# Patient Record
Sex: FEMALE | Race: ASIAN | NOT HISPANIC OR LATINO | URBAN - METROPOLITAN AREA
[De-identification: names, ages, dates, MRNs, and addresses within clinical notes are randomized per-mention and may not be internally consistent; named-entity substitution may affect disease eponyms.]

---

## 2017-02-23 ENCOUNTER — EMERGENCY (EMERGENCY)
Facility: HOSPITAL | Age: 37
LOS: 1 days | Discharge: HOME | End: 2017-02-23
Attending: EMERGENCY MEDICINE | Admitting: EMERGENCY MEDICINE
Payer: COMMERCIAL

## 2017-02-23 VITALS
SYSTOLIC BLOOD PRESSURE: 120 MMHG | RESPIRATION RATE: 14 BRPM | HEART RATE: 82 BPM | TEMPERATURE: 97 F | DIASTOLIC BLOOD PRESSURE: 77 MMHG | OXYGEN SATURATION: 98 %

## 2017-02-23 VITALS
DIASTOLIC BLOOD PRESSURE: 78 MMHG | SYSTOLIC BLOOD PRESSURE: 119 MMHG | HEART RATE: 88 BPM | TEMPERATURE: 99 F | OXYGEN SATURATION: 98 % | RESPIRATION RATE: 16 BRPM

## 2017-02-23 DIAGNOSIS — O20.9 HEMORRHAGE IN EARLY PREGNANCY, UNSPECIFIED: ICD-10-CM

## 2017-02-23 DIAGNOSIS — O20.8 OTHER HEMORRHAGE IN EARLY PREGNANCY: ICD-10-CM

## 2017-02-23 DIAGNOSIS — Z3A.10 10 WEEKS GESTATION OF PREGNANCY: ICD-10-CM

## 2017-02-23 LAB
APPEARANCE UR: CLEAR — SIGNIFICANT CHANGE UP
BASOPHILS NFR BLD AUTO: 0.5 % — SIGNIFICANT CHANGE UP (ref 0–2)
BILIRUB UR-MCNC: NEGATIVE — SIGNIFICANT CHANGE UP
BLD GP AB SCN SERPL QL: NEGATIVE — SIGNIFICANT CHANGE UP
COLOR SPEC: YELLOW — SIGNIFICANT CHANGE UP
DIFF PNL FLD: (no result)
EOSINOPHIL NFR BLD AUTO: 1.6 % — SIGNIFICANT CHANGE UP (ref 0–6)
GLUCOSE UR QL: NEGATIVE — SIGNIFICANT CHANGE UP
HCG SERPL-ACNC: SIGNIFICANT CHANGE UP MIU/ML
HCT VFR BLD CALC: 40.2 % — SIGNIFICANT CHANGE UP (ref 34.5–45)
HGB BLD-MCNC: 13.3 G/DL — SIGNIFICANT CHANGE UP (ref 11.5–15.5)
IMM GRANULOCYTES NFR BLD AUTO: 1.2 % — SIGNIFICANT CHANGE UP (ref 0–1.5)
KETONES UR-MCNC: NEGATIVE — SIGNIFICANT CHANGE UP
LEUKOCYTE ESTERASE UR-ACNC: NEGATIVE — SIGNIFICANT CHANGE UP
LYMPHOCYTES # BLD AUTO: 13.7 % — SIGNIFICANT CHANGE UP (ref 13–44)
MCHC RBC-ENTMCNC: 30 PG — SIGNIFICANT CHANGE UP (ref 27–34)
MCHC RBC-ENTMCNC: 33.1 G/DL — SIGNIFICANT CHANGE UP (ref 32–36)
MCV RBC AUTO: 90.5 FL — SIGNIFICANT CHANGE UP (ref 80–100)
MONOCYTES NFR BLD AUTO: 3.5 % — SIGNIFICANT CHANGE UP (ref 2–14)
NEUTROPHILS NFR BLD AUTO: 79.5 % — HIGH (ref 43–77)
NITRITE UR-MCNC: NEGATIVE — SIGNIFICANT CHANGE UP
PH UR: 6 — SIGNIFICANT CHANGE UP (ref 4–8.1)
PLATELET # BLD AUTO: 327 K/UL — SIGNIFICANT CHANGE UP (ref 150–400)
PROT UR-MCNC: NEGATIVE MG/DL — SIGNIFICANT CHANGE UP
RBC # BLD: 4.44 M/UL — SIGNIFICANT CHANGE UP (ref 3.8–5.2)
RBC # FLD: 13.1 % — SIGNIFICANT CHANGE UP (ref 10.3–16.9)
RH IG SCN BLD-IMP: POSITIVE — SIGNIFICANT CHANGE UP
RH IG SCN BLD-IMP: POSITIVE — SIGNIFICANT CHANGE UP
SP GR SPEC: 1.01 — SIGNIFICANT CHANGE UP (ref 1–1.03)
UROBILINOGEN FLD QL: 0.2 E.U./DL — SIGNIFICANT CHANGE UP
WBC # BLD: 17.4 K/UL — HIGH (ref 3.8–10.5)
WBC # FLD AUTO: 17.4 K/UL — HIGH (ref 3.8–10.5)

## 2017-02-23 PROCEDURE — 76830 TRANSVAGINAL US NON-OB: CPT | Mod: 26

## 2017-02-23 PROCEDURE — 99285 EMERGENCY DEPT VISIT HI MDM: CPT

## 2017-02-23 NOTE — ED PROVIDER NOTE - PROGRESS NOTE DETAILS
Pt informed of results, verbalizes understanding. OB Dr. Whitney Nichole called to go over results and have them faxed. Kumar Allen, Resident.

## 2017-02-23 NOTE — ED PROVIDER NOTE - CARE PLAN
Principal Discharge DX:	Vaginal bleeding Principal Discharge DX:	Vaginal bleeding  Secondary Diagnosis:	Subchorionic hematoma in first trimester

## 2017-02-23 NOTE — ED PROVIDER NOTE - MEDICAL DECISION MAKING DETAILS
37F  10wk presents with painless vaginal bleeding episode that has since resolved. Denies symptoms. Had prior US showing IUP. Will obtain blood, urine, and US. Kumar Allen, Resident.

## 2017-02-23 NOTE — ED PROVIDER NOTE - OBJECTIVE STATEMENT
37F  history of hypothyroidism presents with 1 episode of painless vaginal bleeding that started 1 hr prior to arrival while brushing her teeth. Small amount, no clots, and thinks it has ceased. Has history of miscarriage at 10 wk 5 yr ago. Otherwise, has been well and denies lightheadedness, headache, chest pain, shortness of breath, palpitations, abdominal pain, bowel/bladder changes.

## 2017-02-23 NOTE — ED PROVIDER NOTE - ATTENDING CONTRIBUTION TO CARE
37 y.o. female with vaginal bleeding, previously heavy but now just spotting, no pelvic pain, no abd pain, approx 10 weeks pregnant, US (+) IUP with , UA negative, given outpatient OB followup, US findings of subchorionic hemorrhage discussed, encouraged to get OB /fu ASAP, stable for dc home

## 2017-06-24 NOTE — ED ADULT TRIAGE NOTE - BP NONINVASIVE DIASTOLIC (MM HG)
ED Visit Note


First contact with patient:  13:36


CHIEF COMPLAINT: Left-sided forehead laceration








HISTORY OF PRESENT ILLNESS: This 79-year-old female patient presents to the 

emergency department approximately 1 hour after cutting the left forehead, 

above the eyebrow.  Patient states she was walking at a community garage sale 

in [place], when she fell over and object.  She states she fell from 

standing, and reports initially landing on her knees.  She states she believes 

she hit her head off of another object, however is uncertain.  Patient denies 

dizziness, loss of consciousness, headache, visual disturbances, nausea, 

vomiting, drowsiness.  The bleeding stopped shortly after the injury and there 

is no weakness or numbness of the area.  The patient did scrape both knees, 

however states she refuses evaluation of her knees.  Tetanus shot is not up-to-

date, and patient states she will not take a tetanus shot.  The patient denies 

any pain.


  





REVIEW OF SYSTEMS:   A 6 system review of systems was completed with positives 

and pertinent negatives listed in the HPI. 








ALLERGIES: None








MEDICATIONS: None





  


PMH: None








SOCIAL HISTORY: Patient lives locally.  She denies alcohol, drug, tobacco use.








PHYSICAL EXAM: Vital Signs: Reviewed Nurse's notes, vital signs stable.  GENERAL

: 79-year-old female, appears significantly younger than her age, in no acute 

distress, well-developed, well-nourished.  SKIN:  There is a 1.5 cm long 

laceration just above the left eyebrow.  It is superficial and the edges only 

mildly gape apart with traction, but lay well without traction. There is no 

foreign material in the wound and it looks clean. There is no active bleeding.  

No deep structures such as tendons or nerves are seen in the base of the wound.

  Sensation to pain and light touch is intact.








EMERGENCY DEPARTMENT COURSE:  I examined the patient.  Verbal consent was 

obtained to perform the procedure.  The laceration was cleaned with betadine 

and sterile saline and there was no bleeding.  The edges of the laceration were 

approximated and secured with 3 layers of Dermabond glue with good wound 

approximation.  The patient tolerated the procedure well.  The patient was 

discharged home in stable condition.    


 





DIAGNOSIS:  forehead laceration





DIFFERENTIAL DIAGNOSIS: contusion, concussion, subdural hematoma, epidural 

hematoma, infection, and others.








DISCHARGE INSTRUCTIONS & TREATMENT: 


Read DermaBond handout. 





Ice and elevate for swelling and pain. 





Ibuprofen 600 mg and Tylenol 1000 mg every 6 hrs for pain if needed. 





Return to the ED or seek care from your PCP for any signs of infection (

increasing redness, swelling, drainage, fever). Return to the ED if you 

experience headache, dizziness, visual disturbances, loss of consciousness, 

altered mental status, or confusion.





Keep covered when in sun until fully healed then SPF 50 or higher for one year. 





Vitamin E oil if desired two weeks after fully healed for reduction of scar. 





Follow up with your PCP in 2-3 days for recheck of wound.


Problem List


Medical Problems:


(1) Arthritis


Status: Chronic  











Current/Historical Medications


No Active Prescriptions or Reported Meds





Allergies


Coded Allergies:  


     No Known Allergies (Unverified , 6/24/17)





Vital Signs











  Date Time  Temp Pulse Resp B/P (MAP) Pulse Ox O2 Delivery O2 Flow Rate FiO2


 


6/24/17 13:31 36.8 92 18 204/114 95 Room Air  











Departure Information


Impression





 Primary Impression:  


 Facial laceration


 Additional Impression:  


 Fall





Dispostion


Home / Self-Care





Condition


GOOD





Prescriptions





No Active Prescriptions or Reported Meds





Referrals


Hong Lee M.D. (PCP)





Patient Instructions


ED Laceration Facial Skin Glue, My [facility]





Additional Instructions





Read DermaBond handout. 





Ice and elevate for swelling and pain. 





Ibuprofen 600 mg and Tylenol 1000 mg every 6 hrs for pain if needed. 





Return to the ED or seek care from your PCP for any signs of infection (

increasing redness, swelling, drainage, fever). 





Keep covered when in sun until fully healed then SPF 50 or higher for one year. 





Vitamin E oil if desired two weeks after fully healed for reduction of scar. 





Follow up with your PCP in 2-3 days for recheck of wound.





Problem Qualifiers








 Primary Impression:  


 Facial laceration


 Encounter type:  initial encounter  Qualified Codes:  S01.81XA - Laceration 

without foreign body of other part of head, initial encounter


 Additional Impression:  


 Fall


 Encounter type:  initial encounter  Qualified Codes:  W19.XXXA - Unspecified 

fall, initial encounter 77

## 2019-11-13 PROBLEM — Z00.00 ENCOUNTER FOR PREVENTIVE HEALTH EXAMINATION: Status: ACTIVE | Noted: 2019-11-13

## 2019-11-19 ENCOUNTER — APPOINTMENT (OUTPATIENT)
Dept: ANTEPARTUM | Facility: CLINIC | Age: 39
End: 2019-11-19
Payer: COMMERCIAL

## 2019-11-19 PROCEDURE — 76813 OB US NUCHAL MEAS 1 GEST: CPT

## 2019-11-19 PROCEDURE — 76801 OB US < 14 WKS SINGLE FETUS: CPT

## 2019-12-19 ENCOUNTER — APPOINTMENT (OUTPATIENT)
Dept: ANTEPARTUM | Facility: CLINIC | Age: 39
End: 2019-12-19
Payer: COMMERCIAL

## 2019-12-19 PROCEDURE — 76811 OB US DETAILED SNGL FETUS: CPT

## 2019-12-19 PROCEDURE — 76817 TRANSVAGINAL US OBSTETRIC: CPT

## 2020-01-22 ENCOUNTER — APPOINTMENT (OUTPATIENT)
Dept: ANTEPARTUM | Facility: CLINIC | Age: 40
End: 2020-01-22
Payer: COMMERCIAL

## 2020-01-22 PROCEDURE — 76811 OB US DETAILED SNGL FETUS: CPT

## 2020-01-22 PROCEDURE — 76817 TRANSVAGINAL US OBSTETRIC: CPT

## 2020-02-29 ENCOUNTER — OUTPATIENT (OUTPATIENT)
Dept: INPATIENT UNIT | Facility: HOSPITAL | Age: 40
LOS: 1 days | Discharge: HOME | End: 2020-02-29
Payer: COMMERCIAL

## 2020-02-29 VITALS
TEMPERATURE: 98 F | RESPIRATION RATE: 18 BRPM | SYSTOLIC BLOOD PRESSURE: 105 MMHG | HEART RATE: 100 BPM | OXYGEN SATURATION: 100 % | DIASTOLIC BLOOD PRESSURE: 70 MMHG

## 2020-02-29 VITALS
RESPIRATION RATE: 16 BRPM | SYSTOLIC BLOOD PRESSURE: 104 MMHG | DIASTOLIC BLOOD PRESSURE: 66 MMHG | HEART RATE: 106 BPM | TEMPERATURE: 98 F | OXYGEN SATURATION: 98 % | WEIGHT: 125.66 LBS | HEIGHT: 62 IN

## 2020-02-29 LAB
ALBUMIN SERPL ELPH-MCNC: 2.7 G/DL — LOW (ref 3.4–5)
ALP SERPL-CCNC: 60 U/L — SIGNIFICANT CHANGE UP (ref 40–120)
ALT FLD-CCNC: 16 U/L — SIGNIFICANT CHANGE UP (ref 12–42)
ANION GAP SERPL CALC-SCNC: 9 MMOL/L — SIGNIFICANT CHANGE UP (ref 9–16)
APTT BLD: 28.4 SEC — SIGNIFICANT CHANGE UP (ref 27.5–36.3)
AST SERPL-CCNC: 10 U/L — LOW (ref 15–37)
BASOPHILS # BLD AUTO: 0.04 K/UL — SIGNIFICANT CHANGE UP (ref 0–0.2)
BASOPHILS NFR BLD AUTO: 0.4 % — SIGNIFICANT CHANGE UP (ref 0–2)
BILIRUB SERPL-MCNC: 0.3 MG/DL — SIGNIFICANT CHANGE UP (ref 0.2–1.2)
BLD GP AB SCN SERPL QL: NEGATIVE — SIGNIFICANT CHANGE UP
BLD GP AB SCN SERPL QL: NEGATIVE — SIGNIFICANT CHANGE UP
BUN SERPL-MCNC: 15 MG/DL — SIGNIFICANT CHANGE UP (ref 7–23)
CALCIUM SERPL-MCNC: 9.2 MG/DL — SIGNIFICANT CHANGE UP (ref 8.5–10.5)
CHLORIDE SERPL-SCNC: 106 MMOL/L — SIGNIFICANT CHANGE UP (ref 96–108)
CO2 SERPL-SCNC: 26 MMOL/L — SIGNIFICANT CHANGE UP (ref 22–31)
CREAT SERPL-MCNC: 0.68 MG/DL — SIGNIFICANT CHANGE UP (ref 0.5–1.3)
EOSINOPHIL # BLD AUTO: 0.23 K/UL — SIGNIFICANT CHANGE UP (ref 0–0.5)
EOSINOPHIL NFR BLD AUTO: 2.5 % — SIGNIFICANT CHANGE UP (ref 0–6)
GLUCOSE SERPL-MCNC: 127 MG/DL — HIGH (ref 70–99)
HCT VFR BLD CALC: 38.1 % — SIGNIFICANT CHANGE UP (ref 34.5–45)
HGB BLD-MCNC: 12.5 G/DL — SIGNIFICANT CHANGE UP (ref 11.5–15.5)
IMM GRANULOCYTES NFR BLD AUTO: 0.3 % — SIGNIFICANT CHANGE UP (ref 0–1.5)
INR BLD: 0.98 — SIGNIFICANT CHANGE UP (ref 0.88–1.16)
LYMPHOCYTES # BLD AUTO: 1.87 K/UL — SIGNIFICANT CHANGE UP (ref 1–3.3)
LYMPHOCYTES # BLD AUTO: 20.4 % — SIGNIFICANT CHANGE UP (ref 13–44)
MCHC RBC-ENTMCNC: 27.8 PG — SIGNIFICANT CHANGE UP (ref 27–34)
MCHC RBC-ENTMCNC: 32.8 GM/DL — SIGNIFICANT CHANGE UP (ref 32–36)
MCV RBC AUTO: 84.9 FL — SIGNIFICANT CHANGE UP (ref 80–100)
MONOCYTES # BLD AUTO: 0.59 K/UL — SIGNIFICANT CHANGE UP (ref 0–0.9)
MONOCYTES NFR BLD AUTO: 6.4 % — SIGNIFICANT CHANGE UP (ref 2–14)
NEUTROPHILS # BLD AUTO: 6.42 K/UL — SIGNIFICANT CHANGE UP (ref 1.8–7.4)
NEUTROPHILS NFR BLD AUTO: 70 % — SIGNIFICANT CHANGE UP (ref 43–77)
NRBC # BLD: 0 /100 WBCS — SIGNIFICANT CHANGE UP (ref 0–0)
PLATELET # BLD AUTO: 302 K/UL — SIGNIFICANT CHANGE UP (ref 150–400)
POTASSIUM SERPL-MCNC: 4.8 MMOL/L — SIGNIFICANT CHANGE UP (ref 3.5–5.3)
POTASSIUM SERPL-SCNC: 4.8 MMOL/L — SIGNIFICANT CHANGE UP (ref 3.5–5.3)
PROT SERPL-MCNC: 6.7 G/DL — SIGNIFICANT CHANGE UP (ref 6.4–8.2)
PROTHROM AB SERPL-ACNC: 10.8 SEC — SIGNIFICANT CHANGE UP (ref 10–12.9)
RBC # BLD: 4.49 M/UL — SIGNIFICANT CHANGE UP (ref 3.8–5.2)
RBC # FLD: 13.5 % — SIGNIFICANT CHANGE UP (ref 10.3–14.5)
RH IG SCN BLD-IMP: POSITIVE — SIGNIFICANT CHANGE UP
RH IG SCN BLD-IMP: POSITIVE — SIGNIFICANT CHANGE UP
SODIUM SERPL-SCNC: 141 MMOL/L — SIGNIFICANT CHANGE UP (ref 132–145)
WBC # BLD: 9.18 K/UL — SIGNIFICANT CHANGE UP (ref 3.8–10.5)
WBC # FLD AUTO: 9.18 K/UL — SIGNIFICANT CHANGE UP (ref 3.8–10.5)

## 2020-02-29 PROCEDURE — 76805 OB US >/= 14 WKS SNGL FETUS: CPT

## 2020-02-29 PROCEDURE — 86901 BLOOD TYPING SEROLOGIC RH(D): CPT

## 2020-02-29 PROCEDURE — 99285 EMERGENCY DEPT VISIT HI MDM: CPT

## 2020-02-29 PROCEDURE — 85730 THROMBOPLASTIN TIME PARTIAL: CPT

## 2020-02-29 PROCEDURE — 76817 TRANSVAGINAL US OBSTETRIC: CPT | Mod: 26

## 2020-02-29 PROCEDURE — 76818 FETAL BIOPHYS PROFILE W/NST: CPT

## 2020-02-29 PROCEDURE — 36415 COLL VENOUS BLD VENIPUNCTURE: CPT

## 2020-02-29 PROCEDURE — 85025 COMPLETE CBC W/AUTO DIFF WBC: CPT

## 2020-02-29 PROCEDURE — 76805 OB US >/= 14 WKS SNGL FETUS: CPT | Mod: 26

## 2020-02-29 PROCEDURE — 85610 PROTHROMBIN TIME: CPT

## 2020-02-29 PROCEDURE — 80053 COMPREHEN METABOLIC PANEL: CPT

## 2020-02-29 PROCEDURE — 86850 RBC ANTIBODY SCREEN: CPT

## 2020-02-29 PROCEDURE — 76817 TRANSVAGINAL US OBSTETRIC: CPT

## 2020-02-29 NOTE — ED PROVIDER NOTE - ATTENDING CONTRIBUTION TO CARE
Nontoxic well appearing patient A3 here for evaluation of vaginal spotting after minor abdominal trauma at 28 weeks of pregnancy.  No abdominal pain/tenderness.  Sono shows +IUP 26wks, with good FHR. Will transfer to [facility] L&D for further monitoring.

## 2020-02-29 NOTE — ED PROVIDER NOTE - CLINICAL SUMMARY MEDICAL DECISION MAKING FREE TEXT BOX
41 y/o F presents to ED c/o vaginal bleeding s/p fall yesterday.  The fall has since resolved.  She denies back pain, abd pain, cramping.  OB u/s shows , no blood.  Pt discussed with Dr. Tang, OB at [facility].  Will transfer to L&D.  Pt also discussed with Dr. Long at 1060 OB who agrees with plan with transfer.

## 2020-02-29 NOTE — ED ADULT NURSE NOTE - OBJECTIVE STATEMENT
Pt presents to ED c/o vaginal spotting last night s/p mechanical fall landing on abdomen. Pt is currently x28 weeks pregnant,  Pt presents to ED c/o vaginal spotting last night s/p mechanical fall landing on abdomen. Pt is currently x28 weeks pregnant, , x2 abortions and x1 miscarriage in the past, 1 full term. Pt denies any pain or bleeding today, reports baby has been moving normally. Was directed to ED for ultrasound.

## 2020-02-29 NOTE — ED ADULT NURSE NOTE - NSIMPLEMENTINTERV_GEN_ALL_ED
Implemented All Universal Safety Interventions:  [place] to call system. Call bell, personal items and telephone within reach. Instruct patient to call for assistance. Room bathroom lighting operational. Non-slip footwear when patient is off stretcher. Physically safe environment: no spills, clutter or unnecessary equipment. Stretcher in lowest position, wheels locked, appropriate side rails in place.

## 2020-02-29 NOTE — ED PROVIDER NOTE - OBJECTIVE STATEMENT
41 y/o F with PMH of sciatica, thyroid disease, A3, approx 28 weeks gestation, CHRYSTAL May 24, presents to ED c/o scant, brown vaginal bleeding noted after a fall yesterday.  She states she got a sudden spasm while standing at home causing her to fall forward.  She scraped her knees and struck her abd on the coffee table.  She then noted scant vaginal bleeding approx 2 hours after the fall.  She denies abd pain, fevers/chills, weakness, numbness.    reported blood type A+

## 2020-02-29 NOTE — ED ADULT NURSE NOTE - CHPI ED NUR SYMPTOMS NEG
no back pain/no coffee grounds emesis/no vaginal discharge/no discharge/no fever/no pain/no abdominal pain/no nausea/no vomiting

## 2020-02-29 NOTE — ED ADULT TRIAGE NOTE - CHIEF COMPLAINT QUOTE
patient walk in c/o right leg pain; "sciatica "; also wants eval for vaginal bleeding last night; brown discharge; 28 weeks pregnant; denies any abd pain or vaginal bleeding at this moment

## 2020-03-01 RX ORDER — SODIUM CHLORIDE 9 MG/ML
1000 INJECTION, SOLUTION INTRAVENOUS
Refills: 0 | Status: DISCONTINUED | OUTPATIENT
Start: 2020-03-01 | End: 2020-03-01

## 2020-03-01 RX ORDER — AMPICILLIN TRIHYDRATE 250 MG
2 CAPSULE ORAL ONCE
Refills: 0 | Status: DISCONTINUED | OUTPATIENT
Start: 2020-03-01 | End: 2020-03-01

## 2020-03-01 RX ORDER — CITRIC ACID/SODIUM CITRATE 300-500 MG
15 SOLUTION, ORAL ORAL EVERY 6 HOURS
Refills: 0 | Status: DISCONTINUED | OUTPATIENT
Start: 2020-03-01 | End: 2020-03-01

## 2020-03-01 RX ORDER — OXYTOCIN 10 UNIT/ML
333.33 VIAL (ML) INJECTION
Qty: 20 | Refills: 0 | Status: DISCONTINUED | OUTPATIENT
Start: 2020-03-01 | End: 2020-03-01

## 2020-03-01 RX ORDER — AMPICILLIN TRIHYDRATE 250 MG
1 CAPSULE ORAL EVERY 4 HOURS
Refills: 0 | Status: DISCONTINUED | OUTPATIENT
Start: 2020-03-01 | End: 2020-03-01

## 2020-03-03 DIAGNOSIS — O26.899 OTHER SPECIFIED PREGNANCY RELATED CONDITIONS, UNSPECIFIED TRIMESTER: ICD-10-CM

## 2020-03-03 DIAGNOSIS — Z3A.00 WEEKS OF GESTATION OF PREGNANCY NOT SPECIFIED: ICD-10-CM

## 2020-03-05 ENCOUNTER — APPOINTMENT (OUTPATIENT)
Dept: ANTEPARTUM | Facility: CLINIC | Age: 40
End: 2020-03-05
Payer: COMMERCIAL

## 2020-03-05 DIAGNOSIS — O26.853 SPOTTING COMPLICATING PREGNANCY, THIRD TRIMESTER: ICD-10-CM

## 2020-03-05 DIAGNOSIS — Z3A.28 28 WEEKS GESTATION OF PREGNANCY: ICD-10-CM

## 2020-03-05 PROBLEM — E07.9 DISORDER OF THYROID, UNSPECIFIED: Chronic | Status: ACTIVE | Noted: 2020-02-29

## 2020-03-05 PROBLEM — M54.30 SCIATICA, UNSPECIFIED SIDE: Chronic | Status: ACTIVE | Noted: 2020-02-29

## 2020-03-05 PROCEDURE — 76816 OB US FOLLOW-UP PER FETUS: CPT

## 2020-03-05 PROCEDURE — 76819 FETAL BIOPHYS PROFIL W/O NST: CPT

## 2020-04-16 ENCOUNTER — APPOINTMENT (OUTPATIENT)
Dept: ANTEPARTUM | Facility: CLINIC | Age: 40
End: 2020-04-16
Payer: COMMERCIAL

## 2020-04-16 PROCEDURE — 76816 OB US FOLLOW-UP PER FETUS: CPT

## 2020-04-16 PROCEDURE — 76819 FETAL BIOPHYS PROFIL W/O NST: CPT

## 2020-05-08 ENCOUNTER — APPOINTMENT (OUTPATIENT)
Dept: ANTEPARTUM | Facility: CLINIC | Age: 40
End: 2020-05-08
Payer: COMMERCIAL

## 2020-05-08 PROCEDURE — 76819 FETAL BIOPHYS PROFIL W/O NST: CPT

## 2020-05-08 PROCEDURE — 76815 OB US LIMITED FETUS(S): CPT

## 2020-05-14 ENCOUNTER — APPOINTMENT (OUTPATIENT)
Dept: ANTEPARTUM | Facility: CLINIC | Age: 40
End: 2020-05-14
Payer: COMMERCIAL

## 2020-05-14 PROCEDURE — 76819 FETAL BIOPHYS PROFIL W/O NST: CPT

## 2020-05-21 ENCOUNTER — TRANSCRIPTION ENCOUNTER (OUTPATIENT)
Age: 40
End: 2020-05-21

## 2020-05-21 ENCOUNTER — OUTPATIENT (OUTPATIENT)
Dept: OUTPATIENT SERVICES | Facility: HOSPITAL | Age: 40
LOS: 1 days | End: 2020-05-21
Payer: COMMERCIAL

## 2020-05-21 DIAGNOSIS — Z01.818 ENCOUNTER FOR OTHER PREPROCEDURAL EXAMINATION: ICD-10-CM

## 2020-05-21 LAB
BLD GP AB SCN SERPL QL: NEGATIVE — SIGNIFICANT CHANGE UP
HCT VFR BLD CALC: 37.4 % — SIGNIFICANT CHANGE UP (ref 34.5–45)
HGB BLD-MCNC: 11.4 G/DL — LOW (ref 11.5–15.5)
MCHC RBC-ENTMCNC: 25.1 PG — LOW (ref 27–34)
MCHC RBC-ENTMCNC: 30.5 GM/DL — LOW (ref 32–36)
MCV RBC AUTO: 82.2 FL — SIGNIFICANT CHANGE UP (ref 80–100)
NRBC # BLD: 0 /100 WBCS — SIGNIFICANT CHANGE UP (ref 0–0)
PLATELET # BLD AUTO: 285 K/UL — SIGNIFICANT CHANGE UP (ref 150–400)
RBC # BLD: 4.55 M/UL — SIGNIFICANT CHANGE UP (ref 3.8–5.2)
RBC # FLD: 14.5 % — SIGNIFICANT CHANGE UP (ref 10.3–14.5)
RH IG SCN BLD-IMP: POSITIVE — SIGNIFICANT CHANGE UP
WBC # BLD: 8.07 K/UL — SIGNIFICANT CHANGE UP (ref 3.8–10.5)
WBC # FLD AUTO: 8.07 K/UL — SIGNIFICANT CHANGE UP (ref 3.8–10.5)

## 2020-05-21 PROCEDURE — 86850 RBC ANTIBODY SCREEN: CPT

## 2020-05-21 PROCEDURE — 85027 COMPLETE CBC AUTOMATED: CPT

## 2020-05-21 PROCEDURE — 86780 TREPONEMA PALLIDUM: CPT

## 2020-05-21 PROCEDURE — 86901 BLOOD TYPING SEROLOGIC RH(D): CPT

## 2020-05-22 ENCOUNTER — INPATIENT (INPATIENT)
Facility: HOSPITAL | Age: 40
LOS: 1 days | Discharge: HOME | End: 2020-05-24
Attending: OBSTETRICS & GYNECOLOGY | Admitting: OBSTETRICS & GYNECOLOGY
Payer: COMMERCIAL

## 2020-05-22 ENCOUNTER — RESULT REVIEW (OUTPATIENT)
Age: 40
End: 2020-05-22

## 2020-05-22 VITALS — HEIGHT: 65 IN | WEIGHT: 138.89 LBS

## 2020-05-22 LAB — T PALLIDUM AB TITR SER: NEGATIVE — SIGNIFICANT CHANGE UP

## 2020-05-22 PROCEDURE — 59514 CESAREAN DELIVERY ONLY: CPT | Mod: AS

## 2020-05-22 PROCEDURE — 88307 TISSUE EXAM BY PATHOLOGIST: CPT | Mod: 26

## 2020-05-22 RX ORDER — CITRIC ACID/SODIUM CITRATE 300-500 MG
30 SOLUTION, ORAL ORAL ONCE
Refills: 0 | Status: DISCONTINUED | OUTPATIENT
Start: 2020-05-22 | End: 2020-05-22

## 2020-05-22 RX ORDER — LANOLIN
1 OINTMENT (GRAM) TOPICAL EVERY 6 HOURS
Refills: 0 | Status: DISCONTINUED | OUTPATIENT
Start: 2020-05-22 | End: 2020-05-24

## 2020-05-22 RX ORDER — OXYTOCIN 10 UNIT/ML
333.33 VIAL (ML) INJECTION
Qty: 20 | Refills: 0 | Status: DISCONTINUED | OUTPATIENT
Start: 2020-05-22 | End: 2020-05-22

## 2020-05-22 RX ORDER — IBUPROFEN 200 MG
600 TABLET ORAL EVERY 6 HOURS
Refills: 0 | Status: COMPLETED | OUTPATIENT
Start: 2020-05-22 | End: 2021-04-20

## 2020-05-22 RX ORDER — KETOROLAC TROMETHAMINE 30 MG/ML
30 SYRINGE (ML) INJECTION EVERY 6 HOURS
Refills: 0 | Status: DISCONTINUED | OUTPATIENT
Start: 2020-05-22 | End: 2020-05-23

## 2020-05-22 RX ORDER — SODIUM CHLORIDE 9 MG/ML
1000 INJECTION, SOLUTION INTRAVENOUS
Refills: 0 | Status: DISCONTINUED | OUTPATIENT
Start: 2020-05-22 | End: 2020-05-22

## 2020-05-22 RX ORDER — SODIUM CHLORIDE 9 MG/ML
1000 INJECTION, SOLUTION INTRAVENOUS
Refills: 0 | Status: DISCONTINUED | OUTPATIENT
Start: 2020-05-22 | End: 2020-05-24

## 2020-05-22 RX ORDER — MAGNESIUM HYDROXIDE 400 MG/1
30 TABLET, CHEWABLE ORAL
Refills: 0 | Status: DISCONTINUED | OUTPATIENT
Start: 2020-05-22 | End: 2020-05-24

## 2020-05-22 RX ORDER — SODIUM CHLORIDE 9 MG/ML
1000 INJECTION, SOLUTION INTRAVENOUS ONCE
Refills: 0 | Status: DISCONTINUED | OUTPATIENT
Start: 2020-05-22 | End: 2020-05-22

## 2020-05-22 RX ORDER — OXYTOCIN 10 UNIT/ML
333.33 VIAL (ML) INJECTION
Qty: 20 | Refills: 0 | Status: DISCONTINUED | OUTPATIENT
Start: 2020-05-22 | End: 2020-05-24

## 2020-05-22 RX ORDER — METOCLOPRAMIDE HCL 10 MG
10 TABLET ORAL ONCE
Refills: 0 | Status: DISCONTINUED | OUTPATIENT
Start: 2020-05-22 | End: 2020-05-22

## 2020-05-22 RX ORDER — OXYCODONE HYDROCHLORIDE 5 MG/1
5 TABLET ORAL ONCE
Refills: 0 | Status: DISCONTINUED | OUTPATIENT
Start: 2020-05-22 | End: 2020-05-24

## 2020-05-22 RX ORDER — TETANUS TOXOID, REDUCED DIPHTHERIA TOXOID AND ACELLULAR PERTUSSIS VACCINE, ADSORBED 5; 2.5; 8; 8; 2.5 [IU]/.5ML; [IU]/.5ML; UG/.5ML; UG/.5ML; UG/.5ML
0.5 SUSPENSION INTRAMUSCULAR ONCE
Refills: 0 | Status: COMPLETED | OUTPATIENT
Start: 2020-05-22

## 2020-05-22 RX ORDER — FAMOTIDINE 10 MG/ML
20 INJECTION INTRAVENOUS ONCE
Refills: 0 | Status: DISCONTINUED | OUTPATIENT
Start: 2020-05-22 | End: 2020-05-22

## 2020-05-22 RX ORDER — ENOXAPARIN SODIUM 100 MG/ML
40 INJECTION SUBCUTANEOUS EVERY 24 HOURS
Refills: 0 | Status: DISCONTINUED | OUTPATIENT
Start: 2020-05-23 | End: 2020-05-24

## 2020-05-22 RX ORDER — DIPHENHYDRAMINE HCL 50 MG
25 CAPSULE ORAL EVERY 6 HOURS
Refills: 0 | Status: DISCONTINUED | OUTPATIENT
Start: 2020-05-22 | End: 2020-05-23

## 2020-05-22 RX ORDER — OXYCODONE HYDROCHLORIDE 5 MG/1
5 TABLET ORAL
Refills: 0 | Status: DISCONTINUED | OUTPATIENT
Start: 2020-05-22 | End: 2020-05-24

## 2020-05-22 RX ORDER — ONDANSETRON 8 MG/1
4 TABLET, FILM COATED ORAL EVERY 6 HOURS
Refills: 0 | Status: DISCONTINUED | OUTPATIENT
Start: 2020-05-22 | End: 2020-05-24

## 2020-05-22 RX ORDER — SIMETHICONE 80 MG/1
80 TABLET, CHEWABLE ORAL EVERY 4 HOURS
Refills: 0 | Status: DISCONTINUED | OUTPATIENT
Start: 2020-05-22 | End: 2020-05-24

## 2020-05-22 RX ORDER — ACETAMINOPHEN 500 MG
975 TABLET ORAL
Refills: 0 | Status: DISCONTINUED | OUTPATIENT
Start: 2020-05-22 | End: 2020-05-24

## 2020-05-22 RX ORDER — CEFAZOLIN SODIUM 1 G
2000 VIAL (EA) INJECTION ONCE
Refills: 0 | Status: DISCONTINUED | OUTPATIENT
Start: 2020-05-22 | End: 2020-05-22

## 2020-05-22 RX ORDER — NALOXONE HYDROCHLORIDE 4 MG/.1ML
0.1 SPRAY NASAL
Refills: 0 | Status: DISCONTINUED | OUTPATIENT
Start: 2020-05-22 | End: 2020-05-24

## 2020-05-22 RX ADMIN — Medication 975 MILLIGRAM(S): at 16:46

## 2020-05-22 RX ADMIN — Medication 30 MILLIGRAM(S): at 15:45

## 2020-05-22 RX ADMIN — Medication 25 MILLIGRAM(S): at 22:41

## 2020-05-22 RX ADMIN — Medication 25 MILLIGRAM(S): at 16:46

## 2020-05-22 RX ADMIN — Medication 30 MILLILITER(S): at 12:50

## 2020-05-22 RX ADMIN — FAMOTIDINE 20 MILLIGRAM(S): 10 INJECTION INTRAVENOUS at 12:50

## 2020-05-22 RX ADMIN — Medication 975 MILLIGRAM(S): at 21:13

## 2020-05-22 RX ADMIN — Medication 100 MILLIGRAM(S): at 12:50

## 2020-05-22 NOTE — LACTATION INITIAL EVALUATION - NS LACT CON REASON FOR REQ
multiparous mom/Repeat C/Section of baby boy @ 39.4 wks GA. . Currently 9 hrs old @ time of consult. Voiding & stooling appropriately for DOL. Now 2725 gms with 1.8% wt loss. Chemstrip results stable for SGA. Met dyad with FOB holding baby. Mom states she had just  an hour ago. Baby sleeping. Did not disturb baby to do oral assessment. Maternal anatomy assessed. Everted nipples. Expressible colostrum bilaterally. HE encouraged. Mom reports baby has been latching well, strong pulling of nipple & denies pain or pinching during feeding. Mom declines hands on help @ this time; she exhibits basic knowledge of breastfeeding & verbalizes confidence in the breastfeeding process. Mom states she  her 2.8 yr old daughter for 15 months & that her milk took one long week to come in. Encouraged Mom to do plenty of S2S & breastfeed on demand at least 8-12x/day from start of each feed. Breastfeeding guidelines, early feeding cues, cluster feedings & infant output requirements reviewed. All questions answered. Mom expressed understanding. Reassurance provided. Mom encouraged to ask for assistance as needed from RN or LC. Primary RN made aware of consult session.

## 2020-05-23 LAB
BASOPHILS # BLD AUTO: 0.03 K/UL — SIGNIFICANT CHANGE UP (ref 0–0.2)
BASOPHILS NFR BLD AUTO: 0.3 % — SIGNIFICANT CHANGE UP (ref 0–2)
EOSINOPHIL # BLD AUTO: 0.02 K/UL — SIGNIFICANT CHANGE UP (ref 0–0.5)
EOSINOPHIL NFR BLD AUTO: 0.2 % — SIGNIFICANT CHANGE UP (ref 0–6)
HCT VFR BLD CALC: 22.9 % — LOW (ref 34.5–45)
HGB BLD-MCNC: 7.1 G/DL — LOW (ref 11.5–15.5)
IMM GRANULOCYTES NFR BLD AUTO: 0.3 % — SIGNIFICANT CHANGE UP (ref 0–1.5)
LYMPHOCYTES # BLD AUTO: 2.41 K/UL — SIGNIFICANT CHANGE UP (ref 1–3.3)
LYMPHOCYTES # BLD AUTO: 20.5 % — SIGNIFICANT CHANGE UP (ref 13–44)
MCHC RBC-ENTMCNC: 25.5 PG — LOW (ref 27–34)
MCHC RBC-ENTMCNC: 31 GM/DL — LOW (ref 32–36)
MCV RBC AUTO: 82.4 FL — SIGNIFICANT CHANGE UP (ref 80–100)
MONOCYTES # BLD AUTO: 0.89 K/UL — SIGNIFICANT CHANGE UP (ref 0–0.9)
MONOCYTES NFR BLD AUTO: 7.6 % — SIGNIFICANT CHANGE UP (ref 2–14)
NEUTROPHILS # BLD AUTO: 8.39 K/UL — HIGH (ref 1.8–7.4)
NEUTROPHILS NFR BLD AUTO: 71.1 % — SIGNIFICANT CHANGE UP (ref 43–77)
NRBC # BLD: 0 /100 WBCS — SIGNIFICANT CHANGE UP (ref 0–0)
PLATELET # BLD AUTO: 208 K/UL — SIGNIFICANT CHANGE UP (ref 150–400)
RBC # BLD: 2.78 M/UL — LOW (ref 3.8–5.2)
RBC # FLD: 14.3 % — SIGNIFICANT CHANGE UP (ref 10.3–14.5)
WBC # BLD: 11.78 K/UL — HIGH (ref 3.8–10.5)
WBC # FLD AUTO: 11.78 K/UL — HIGH (ref 3.8–10.5)

## 2020-05-23 RX ORDER — DIPHENHYDRAMINE HCL 50 MG
50 CAPSULE ORAL EVERY 4 HOURS
Refills: 0 | Status: DISCONTINUED | OUTPATIENT
Start: 2020-05-23 | End: 2020-05-24

## 2020-05-23 RX ORDER — LORATADINE 10 MG/1
10 TABLET ORAL DAILY
Refills: 0 | Status: DISCONTINUED | OUTPATIENT
Start: 2020-05-23 | End: 2020-05-24

## 2020-05-23 RX ORDER — IBUPROFEN 200 MG
600 TABLET ORAL EVERY 6 HOURS
Refills: 0 | Status: DISCONTINUED | OUTPATIENT
Start: 2020-05-23 | End: 2020-05-24

## 2020-05-23 RX ADMIN — Medication 975 MILLIGRAM(S): at 19:31

## 2020-05-23 RX ADMIN — Medication 30 MILLIGRAM(S): at 06:28

## 2020-05-23 RX ADMIN — Medication 30 MILLIGRAM(S): at 00:59

## 2020-05-23 RX ADMIN — Medication 50 MILLIGRAM(S): at 12:54

## 2020-05-23 RX ADMIN — Medication 600 MILLIGRAM(S): at 17:41

## 2020-05-23 RX ADMIN — LORATADINE 10 MILLIGRAM(S): 10 TABLET ORAL at 13:02

## 2020-05-23 RX ADMIN — Medication 600 MILLIGRAM(S): at 23:03

## 2020-05-23 RX ADMIN — ENOXAPARIN SODIUM 40 MILLIGRAM(S): 100 INJECTION SUBCUTANEOUS at 06:29

## 2020-05-23 RX ADMIN — Medication 975 MILLIGRAM(S): at 13:03

## 2020-05-23 NOTE — PROGRESS NOTE ADULT - ASSESSMENT
40y Female POD#1 s/p C/S, Uncomplicated                                       1. Neuro/Pain:  OPM  2  CV:  VS per routine  3. Pulm: Encourage ISS & Ambulation  4. GI:  Advance as uziel  5. DVT ppx: SCDs, SQH 5000 mg BID  6. Dispo: POD #2 or #3

## 2020-05-23 NOTE — PROGRESS NOTE ADULT - ATTENDING COMMENTS
Patient seen and examined at bedside, overall feels well. Feeling itching from spinal, pain controlled. Plan for discharge POD2 if patinet remains hemodynamically stable

## 2020-05-24 ENCOUNTER — TRANSCRIPTION ENCOUNTER (OUTPATIENT)
Age: 40
End: 2020-05-24

## 2020-05-24 VITALS
OXYGEN SATURATION: 99 % | TEMPERATURE: 98 F | RESPIRATION RATE: 18 BRPM | HEART RATE: 89 BPM | DIASTOLIC BLOOD PRESSURE: 59 MMHG | SYSTOLIC BLOOD PRESSURE: 98 MMHG

## 2020-05-24 PROCEDURE — 90715 TDAP VACCINE 7 YRS/> IM: CPT

## 2020-05-24 PROCEDURE — 85025 COMPLETE CBC W/AUTO DIFF WBC: CPT

## 2020-05-24 PROCEDURE — 90707 MMR VACCINE SC: CPT

## 2020-05-24 PROCEDURE — 36415 COLL VENOUS BLD VENIPUNCTURE: CPT

## 2020-05-24 PROCEDURE — 88307 TISSUE EXAM BY PATHOLOGIST: CPT

## 2020-05-24 PROCEDURE — C1765: CPT

## 2020-05-24 RX ORDER — IBUPROFEN 200 MG
1 TABLET ORAL
Qty: 0 | Refills: 0 | DISCHARGE
Start: 2020-05-24

## 2020-05-24 RX ORDER — ACETAMINOPHEN 500 MG
3 TABLET ORAL
Qty: 0 | Refills: 0 | DISCHARGE
Start: 2020-05-24

## 2020-05-24 RX ORDER — TETANUS TOXOID, REDUCED DIPHTHERIA TOXOID AND ACELLULAR PERTUSSIS VACCINE, ADSORBED 5; 2.5; 8; 8; 2.5 [IU]/.5ML; [IU]/.5ML; UG/.5ML; UG/.5ML; UG/.5ML
0.5 SUSPENSION INTRAMUSCULAR ONCE
Refills: 0 | Status: COMPLETED | OUTPATIENT
Start: 2020-05-24 | End: 2020-05-24

## 2020-05-24 RX ADMIN — TETANUS TOXOID, REDUCED DIPHTHERIA TOXOID AND ACELLULAR PERTUSSIS VACCINE, ADSORBED 0.5 MILLILITER(S): 5; 2.5; 8; 8; 2.5 SUSPENSION INTRAMUSCULAR at 09:20

## 2020-05-24 RX ADMIN — Medication 600 MILLIGRAM(S): at 11:49

## 2020-05-24 RX ADMIN — Medication 0.5 MILLILITER(S): at 10:52

## 2020-05-24 RX ADMIN — Medication 600 MILLIGRAM(S): at 06:29

## 2020-05-24 RX ADMIN — Medication 975 MILLIGRAM(S): at 07:53

## 2020-05-24 RX ADMIN — ENOXAPARIN SODIUM 40 MILLIGRAM(S): 100 INJECTION SUBCUTANEOUS at 06:28

## 2020-05-24 NOTE — DISCHARGE NOTE OB - HOSPITAL COURSE
Patient is status post a c/s. She had an uncomplicated postoperative course and has met her postoperative milestones appropriately.  Vitals are stable for discharge.

## 2020-05-24 NOTE — DISCHARGE NOTE OB - CARE PLAN
Principal Discharge DX:	Postpartum state  Goal:	discharge home  Assessment and plan of treatment:	Take Motrin 600mg every 6 hours and/or tylenol 650mg every 6 hours as needed for pain. Call your OBGYN to schedule a follow up appointment in 1-2weeks. Keep incision site clean and dry. Call your OBGYN if you experiences severe abdominal pain not improved by oral pain medications, heavy bright red vaginal bleeding saturating more than 1 pad per hour, red /warmth at incision site, drainage from incision site, or fever greater than 100.4F.

## 2020-05-24 NOTE — DISCHARGE NOTE OB - CARE PROVIDER_API CALL
Radha Long  OBSTETRICS AND GYNECOLOGY  [facility] [place] [address]  Phone: [phone]  Fax: [phone]  Follow Up Time:

## 2020-05-24 NOTE — DISCHARGE NOTE OB - PLAN OF CARE
discharge home Take Motrin 600mg every 6 hours and/or tylenol 650mg every 6 hours as needed for pain. Call your OBGYN to schedule a follow up appointment in 1-2weeks. Keep incision site clean and dry. Call your OBGYN if you experiences severe abdominal pain not improved by oral pain medications, heavy bright red vaginal bleeding saturating more than 1 pad per hour, red /warmth at incision site, drainage from incision site, or fever greater than 100.4F.

## 2020-05-24 NOTE — DISCHARGE NOTE OB - PATIENT PORTAL LINK FT
You can access the FollowMyHealth Patient Portal offered by [facility] by registering at the following website: [email or web address]. By joining [employer]’s FollowMyHealth portal, you will also be able to view your health information using other applications (apps) compatible with our system.

## 2020-05-26 LAB — SURGICAL PATHOLOGY STUDY: SIGNIFICANT CHANGE UP

## 2020-05-27 DIAGNOSIS — Z3A.39 39 WEEKS GESTATION OF PREGNANCY: ICD-10-CM

## 2020-05-27 DIAGNOSIS — R87.821 VAGINAL LOW RISK HUMAN PAPILLOMAVIRUS (HPV) DNA TEST POSITIVE: ICD-10-CM

## 2020-05-27 DIAGNOSIS — O34.211 MATERNAL CARE FOR LOW TRANSVERSE SCAR FROM PREVIOUS CESAREAN DELIVERY: ICD-10-CM

## 2020-05-27 DIAGNOSIS — E03.9 HYPOTHYROIDISM, UNSPECIFIED: ICD-10-CM

## 2022-09-13 NOTE — ED PROVIDER NOTE - RESPIRATORY, MLM
Dr. Anjana Lopes [phone] Dr. Ordonez (cardio) [phone]
Breath sounds clear and equal bilaterally.

## 2024-04-20 NOTE — ED PROVIDER NOTE - NS ED ATTENDING STATEMENT MOD
English I have personally seen and examined this patient. I have fully participated in the care of this patient. I have reviewed all pertinent clinical information, including history physical exam, plan and the Resident's note and agree except as noted